# Patient Record
Sex: FEMALE | Race: BLACK OR AFRICAN AMERICAN | NOT HISPANIC OR LATINO | Employment: OTHER
[De-identification: names, ages, dates, MRNs, and addresses within clinical notes are randomized per-mention and may not be internally consistent; named-entity substitution may affect disease eponyms.]

---

## 2023-12-20 ENCOUNTER — TRANSCRIBE ORDERS (OUTPATIENT)
Dept: OTHER | Age: 78
End: 2023-12-20

## 2023-12-20 DIAGNOSIS — K63.89 SMALL BOWEL POLYP: Primary | ICD-10-CM

## 2023-12-21 ENCOUNTER — DOCUMENTATION ONLY (OUTPATIENT)
Dept: GASTROENTEROLOGY | Facility: CLINIC | Age: 78
End: 2023-12-21
Payer: COMMERCIAL

## 2023-12-21 NOTE — PROGRESS NOTES
Called to obtain copy of capsule study.    Provided mailing address.    Records Requested:  -- Capsule study (11-16-23)    Facility Information:  [facility] Gastroenterology   [address] [place] [address].   [place] [address]   Phone #: [phone]       SK

## 2023-12-22 NOTE — PROGRESS NOTES
Received call from [facility] [place] [facility]. Caller stated that they would send video capsule via [employer].    Provided e-mail address.     Records Requested:  -- Capsule study (11-16-23)     Facility Information:  [facility]   [facility] [address] Gastroenterology   [address] [facility] [place] [address]   Phone #: [phone]         SK

## 2023-12-27 NOTE — PROGRESS NOTES
Called [facility] to follow-up on request.     GI Nurse relayed that an IT ticket had been placed today (12-27-23) to complete request.     Will follow-up as needed.    Records Requested:  -- Capsule study (11-16-23)     Facility Information:  [facility] Gastroenterology   [address] [address]   Phone #: [phone]         SK

## 2024-01-03 NOTE — PROGRESS NOTES
Called [facility] to follow-up on request.     GI Nurse relayed that an IT ticket was still pending for the order. RN stated that she would inquire as to the delay and reach out if necessary.     Will follow-up as needed.     Records Requested:  -- Capsule study (11-16-23)     Facility Information:  [facility] [address] Gastroenterology   [address] [facility] [place] [address]   Phone #: [phone]       SK

## 2024-01-05 ENCOUNTER — TELEPHONE (OUTPATIENT)
Dept: GASTROENTEROLOGY | Facility: CLINIC | Age: 79
End: 2024-01-05
Payer: MEDICARE

## 2024-01-10 NOTE — TELEPHONE ENCOUNTER
Called patient with update on referral, still awaiting capsule study and overread by our physician.   Left message    ML  
[facility]    Phone Message    May a detailed message be left on voicemail: yes     Reason for Call: Other: Melissa is calling in once more to schedule an appt from the referral that we received, but it has not been triaged as of yet. Please call back as soon as possible to discuss.     Action Taken: Message routed to:  [facility] GI    Travel Screening: Not Applicable                                                                    
[facility] Call [facility]    Phone Message    May a detailed message be left on voicemail: yes     Reason for Call: Other: Pt is requesting a call back please to schedule for her referral from 12/20/23, the referral has not yet been triaged. Please advise. Thank you!     Action Taken: Message routed to:  [facility] GI    Travel Screening: Not Applicable                                                                    
Patient called back, explained we are waiting on the capsule study from [employer], will call with further plans once that is reviewed    ML  
Surgical Assessment Completed on: 03-Jun-2023 22:00

## 2024-01-12 NOTE — PROGRESS NOTES
[facility] to follow-up on request.     Requested that CD be mailed out. Confirmed mailing address. Will follow-up as needed.     Records Requested:  -- Capsule study (11-16-23)     Facility Information:  [facility]   Specialty [place] [address] Gastroenterology   [address] [facility] [place] [address]   Phone #: [phone]         SK

## 2024-01-24 ENCOUNTER — TELEPHONE (OUTPATIENT)
Dept: GASTROENTEROLOGY | Facility: CLINIC | Age: 79
End: 2024-01-24
Payer: MEDICARE

## 2024-01-24 DIAGNOSIS — R93.3 ABNORMAL FINDING ON GI TRACT IMAGING: Primary | ICD-10-CM

## 2024-01-24 NOTE — TELEPHONE ENCOUNTER
Advanced Endoscopy     Referring provider: LISE Castaneda affiliated with [facility] at [address] [place] [address].     Referred to: [facility]     Provider Requested: na     Referral Received: 23, capsule received 24     Records received: CareEverywhere     Images received: capsule being delivered to Dr. Sellers    Insurance Coverage: tbd    Evaluation for: Capsule with  Nonbleeding gastric and small bowel angioectasias and Polypoid mass in distal small bowel     Clinical History (per RN review):     Patient has history of iron deficiency anemia. Colonoscopy in October showed friable tissue but capsule recommended as follow up.   Capsule showing non bleeding angioectasias and Polypoid mass in distal small bowel      Capsule Study 23  CAPSULE ENDOSCOPY PROCEDURE REPORT    DATE OF SERVICE: 2023  : 1945    SUBJECTIVE: The patient referred for capsule endoscopy to further evaluate iron deficiency. Recent EGD, colonoscopy performed this year. EGD notable for gastropathy. Biopsies confirmed the presence of limited intestinal metaplasia at the greater curvature of the gastric antrum biopsies. Remainder of biopsies unremarkable without evidence of intestinal metaplasia or Helicobacter pylori. She is recommended upper endoscopy in 3 years given those findings. She was also referred for colonoscopy notable for some friability with spontaneous bleeding in the ascending colon, otherwise unremarkable. Biopsies from that area were normal. She was referred for capsule endoscopy.    CAPSULE ENDOSCOPY FINDINGS:     1. First gastric image 00:00:22.  2. First duodenal image 01:39:37.  3. First cecal image 04:34:48.  4. Total gastric passage, 1 hour 39 minutes.  5. Total small bowel passage, 2 hours 55 minutes.    Endoscopic views of the esophagus are limited, but grossly normal. Nonspecific irregularities in the gastric mucosa seen at 00:08:27,  00:34:54, 01:27:44 and 01:28:48. Unclear if these may represent changes from recognized intestinal metaplasia versus irritation from medications. Nonbleeding angiectasia seen in the stomach at 01:29:55 and 01:02:17. Stomach was otherwise unremarkable. Similar angioectasias seen scattered throughout the small bowel, all are nonbleeding. These were visualized at 02:26:09, 02:26:15, 02:49:38, 02:49:56, 03:32:14, 04:10:49, 04:10:50, 04:13:28, 04:14:10, 04:15:06, 04:15:08, 04:16:12, 04:17:15, 04:18:38, 04:21:13, 04:26:31, 04:28:43. In addition, there appears to be a polypoid mass in the distal small bowel. This is seen at 04:17:34, 04:18:54, 04:20:28, 04:21:35, 04:26:11, 04:27:03. Unclear if this is the same polypoid mass seen multiple times or if there could be multiple ones in a small stretch of small bowel. Remainder of small bowel notable for normal villi. Neither fresh nor old blood seen throughout. Endoscopic views of the colon are limited by fecal debris, but are grossly normal.    IMPRESSION, REPORT AND PLAN:     1. Gastric mucosa abnormalities.   Nonspecific gastric mucosal abnormalities seen, may be related to known intestinal metaplasia versus irritation from benign condition such as medications.    2. Nonbleeding gastric and small bowel angioectasias.   Multiple nonbleeding gastric and small bowel angiectasias seen throughout the stomach and small bowel. These do not appear to be actively bleeding and of note, the patient has most recently hemoglobin 10.1 from 4 months ago and ferritin slightly low at 14. Would be reasonable to consider iron supplementation. Could consider endoscopic intervention if the patient has overt bleeding or anemia requiring blood transfusions. Given the widespread nature of the angioectasias, she would likely benefit from referral to a center such as [facility] or [facility], who can perform double balloon endoscopy if treatment of suspected bleeding angioectasias was the  "case.    3. Polypoid mass in distal small bowel  incidentally noted on this exam. It appears to be a polypoid mass with villous features in the distal small bowel, suspect that this may be a villous adenoma. Unclear to what extent the picture is identifying one lesion versus multiple lesions in a small area. Given the presence of this finding, would recommend referral to gastroenterologist at a center such as [facility] or [place], who can perform retrograde balloon assisted endoscopy to identify this lesion and potential removal. Referral and coordination of care to be done by the referring provider.   Recommendations     - As the angioectasias are not actively bleeding continue iron supplementation and lab monitoring per PCP.   - The incidental small bowel polypoid lesion we will need further evaluation and likely resection. This would need to be reached with balloon assisted endoscopy. Thistype of procedure can be performed at a referral center such as [facility] or [facility]. Referring provider should coordinate referral to Gastroenterology at one of those locations for consideration of retrograde balloon assisted endoscopy. \"       EGD/colonoscopy 10-5-23  Findings:       mild/moderate non thrombosed physiologic internal        hemorrhoids        A scattered area of moderately friable mucosa with        spontaneous bleeding was found in the ascending        colon. Biopsies were taken with a cold forceps for        histology. Verification of patient identification for        the specimen was done by the physician and nurse        using the patient's name and birth date. Estimated        blood loss was minimal.        The exam was otherwise without abnormality on direct        and retroflexion views.   Moderate Sedation:        Moderate (conscious) sedation was administered by the        nurse and supervised by the endoscopist. The        patient's oxygen saturation, heart rate, blood        " pressure and response to care were monitored. Total        physician intraservice time was 9 minutes.        This time is the duration from the initial medication        administration until the endoscopy RN assists with        initial maneuvers (biopsy / polypectomy / etc.), or        if no maneuvers are performed, until the endoscopist        leaves the room.   Impression:            - Friability with spontaneous                          bleeding in the ascending colon.                          Biopsied.                          - The examination was otherwise                          normal on direct and retroflexion                          views.   Recommendation:        - Discharge patient to home.                          - Repeat colonoscopy for                          surveillance based on pathology                          results.                          - Return to referring physician as                          previously scheduled.     MD review date:   MD Decision for clinic consultation/Orders:            Referral updates/Patient contacted:

## 2024-01-25 ENCOUNTER — TELEPHONE (OUTPATIENT)
Dept: GASTROENTEROLOGY | Facility: CLINIC | Age: 79
End: 2024-01-25
Payer: MEDICARE

## 2024-01-25 DIAGNOSIS — R93.3 ABNORMAL FINDING ON GI TRACT IMAGING: Primary | ICD-10-CM

## 2024-01-25 RX ORDER — BISACODYL 5 MG/1
TABLET, DELAYED RELEASE ORAL
Qty: 2 TABLET | Refills: 0 | Status: SHIPPED | OUTPATIENT
Start: 2024-01-25

## 2024-01-25 NOTE — TELEPHONE ENCOUNTER
[facility] unable to access prior capsule study (cannot open via multiple attempts) Dr. Sellers recommending repeat capsules at our facility.  Patient agreeable, orders placed for new capsule, pt transferred to scheduling.  ML

## 2024-01-25 NOTE — TELEPHONE ENCOUNTER
Pre visit planning completed.      Procedure details:    Patient scheduled for Video capsule endoscopy  on 2/5/2024.     Arrival time: 0930. Procedure time 1030    Pre op exam needed? N/A    Facility location: [facility]; [address] [place] [address], 3rd Floor, [place] [address]    Sedation type: none     Indication for procedure: Abnormal finding on GI tract imaging [R93.3]       Chart review:     Electronic implanted devices? No    Recent diagnosis of diverticulitis within the last 6 weeks? No    Diabetic? Yes. Not on diabetic medication    Diabetic medication HOLDING recommendations: (if applicable)  Oral diabetic medications: No  Diabetic injectables: No  Insulin: No      Medication review:    Anticoagulants? No    NSAIDS? No NSAID medications per patient's medication list.  RN will verify with pre-assessment call.    Other medication HOLDING recommendations:  Iron supplements: HOLD 7 days before procedure.      Prep for procedure:     Bowel prep recommendation: VCE-Golytely   Due to:  diabetes.     Prep instructions sent via letter     Bowel prep script sent to:please send script to pharmacy of pt's choice        Kassandra Lipscomb RN  Endoscopy Procedure Pre Assessment RN  [phone] option 4

## 2024-01-25 NOTE — TELEPHONE ENCOUNTER
Non-Invasive GI Procedure Scheduling Questionnaire    Have you had a positive Covid test in the last 14 days?  No    Are you active on [employer]?   Yes    What insurance is in the chart?  Other:  medicare     Ordering/Referring Provider:     JOHN OJEDA       (If ordering provider performs procedure, schedule with ordering provider unless otherwise instructed. )    (Females) Are you currently pregnant? No - Okay to continue scheduling.    Have you ever had or are you awaiting a heart or lung transplant? No - Schedule next available.    Do you need assistance transferring? No - Continue scheduling.    Do you take acid reflux medication or proton-pump inhibitors (PPI)? Yes - VCE         Patient Reminders:  Please inform patients to review instructions sent via [employer] or letter two weeks before procedure.  The Manometry exam may take up to 90 minutes.  The Breath Test exam may take up to 4 hours.

## 2024-01-25 NOTE — TELEPHONE ENCOUNTER
Pre assessment completed for upcoming procedure.   (Please see previous telephone encounter notes for complete details)    Patient  returned call.       Procedure details:    Arrival time and facility location reviewed.    Pre op exam needed? N/A    Designated  policy reviewed. Instructed to have someone stay 0 hours post procedure.     COVID policy reviewed.      Medication review:    Ferrous Sulfate (iron supplement): HOLD 7 days before procedure.      Prep for procedure:     Procedure prep instructions reviewed.        Any additional information needed:  N/A      Patient  verbalized understanding and had no questions or concerns at this time.      Kassandra Lipscomb RN  Endoscopy Procedure Pre Assessment RN  [phone] option 4

## 2024-01-25 NOTE — LETTER
January 25, 2024      Melissa Richey  [address]   [facility] [address]              Video capsule endoscopy      Procedure date: 2/5/2024    Anticipated arrival time: 9:30 AM   (Procedure Times are Subject to Change)    [facility] location[facility]; [address], [place] [address] - Check in location: Main entrance at registration desk. Parking information: Self pay parking available in the Patient & Visitor Ramp on the corner of  [facility] and [facility] [facility].  options are available 24 hours for patients with mobility limitations. Self-park and shuttle service from the parking ramp available. The phone number for shuttle requests is [phone].      Important Procedure Reminders:     Prep Instructions:   Instructions on how to prepare for your upcoming procedure are found below. Please read instructions carefully. Deviation from instructions may result in less than desired outcomes and procedure may need to be rescheduled. If you have additional questions regarding how to prepare for your upcoming procedure, please contact our endoscopy pre assessment nurses at [phone] option 4.      Policy:   On the day of your procedure, please designatean adult(s) who can drive you home and stay with you for 6 hours post procedure. The medicines used in the exam will make you sleepy. You will not be able to drive. You cannot take public transportation, ride share services, or non-medical taxi service without a responsible caregiver.  Medical transport services are allowed with the requirement that a responsible caregiver will receive you at your destination.  We require that drivers and caregivers are confirmed prior to your procedure.    Day of procedure:  Please do not wear jewelry (i.e. earrings, rings, necklaces, watches, etc) .   Leave your purse, billfold, credit cards, and other valuables at home.   Bring insurance card and ID.     To cancel or  reschedule your procedure:   Please call our endoscopy scheduling team at: [facility]oscopy: [phone], option 2. Monday through Friday, 7:00am-5:00pm.      Medication Reminders:    Please note the following medication holding recommendations:   Ferrous Sulfate (iron supplement): HOLD 7 days before procedure.    ----------------------------------------------------------------------------------------------------------------------------------------------------------------------------------------------------------------------------------------------------------------------      Instructions for Video Capsule Endoscopy   (Using Golytely bowel prep)   Check in at: [facility]; [address] [facility], [address] [place] [address], [place] [address]  Please read these instructions carefully at least 7 days before your exam.  Pre-Assessment Phone Number: [facility]; [phone] option 4    What happens during this exam?  You are having an exam that allows us to see inside your small intestines. First, you will swallow a pillsize video camera. It will take pictures of your small intestine over the next 12 hours. The pictures will be sent to a recorder you wear on a belt. You will complete the test at home and return the belt and recorder the next day.    It is up to you to closely follow these instructions to clean out your intestines. If you do not, you may need to repeat the exam.    Getting ready  - A nurse will call before your exam. At this time, we ll discuss any prescriptions you might need to . If it s the day before your exam and you haven t received a call, please call your clinic.  - Check your insurance to be sure the exam is covered.  - If you have advanced kidney disease, are on dialysis, or have a pacemaker, AICD or other implantable device: please call the endoscopy center. We may need to change how you get ready for this exam.    Important: You must  complete all of the steps before the exam.    7 days before your exam:  Talk to your prescribing provider: If you take prescription NSAIDS (such as Sulindac, Celebrex, Mobic, Relafen). Your prescribing provider will tell you what to do.   Stop taking multi-vitamins with iron or medicines that contain iron.   Golytely bowel prep prescription from pharmacy.     1 day before your exam  Begin a clear liquid diet (see page 2). Drink at least 8 to 10 glasses of clear liquids. Avoid colored clear liquids (see examples below).  At 12 p.m. noon take 2 Dulcolax tablets.  Fill the jug that contains the Golytely powder with warm water. Cover and shake until well mixed. Use a full gallon of water. Chill for 3 hours, but do not add ice.  At 3 p.m. Start drinking one 8-ounce glass every 15 minutes of Golytely solution until container is empty.  Stay near a toilet when using this medicine. You will have diarrhea and may have mild cramping or bloating.    Day of your exam   You may have only clear liquids until 2 hours before the arrival time.   If you must take medicine, you may take it with sips of water.    If you have asthma: bring your inhaler with you.   Dress in a loose, two-piece outfit. The sensor belt will be in place over your clothes for 12 hours. Keep the belt on and the recorder connected during this time.   You may drive yourself home.    What are clear liquids?   You may have:  - Water, tea (no cream)  - Clear nutrition drinks (Enlive, Resource Breeze)   - Fat-free soup broth or bouillon  - Clear life savers (avoid all other colors)  - Clear juices, sodas and fruit-flavored drinks such as apple juice, white grape juice, 7up (avoid colored clear liquids)   Do not have:  - Coffee  - Milk or milk products such as ice cream, malts or shakes  - Red or purple drinks of any kind such as cranberry juice or grape juice. Avoid red or purple Jell-O, Popsicles, Michele-Aid, sorbet and candy  - Jello, popsicles as these are  colored.  Juices with pulp such as orange, grapefruit, pineapple or tomato juice  - Cream soups of any kind  - Alcohol       Your video capsule endoscopy  What to do during your test:   After you swallow the pill camera, do not eat or drink for at least 2 hours.   After 2 hours, you may have clear liquids and take medicines.   After 6 hours, you may have a light snack.   When the exam is done (12 hours after you swallowed the pill), remove the belt and recorder.  Place it in a bag to return the next day.   You may return to your normal diet.    Caring for the recorder   Do not move suddenly or bump the recorder.   Do not expose the recorder to direct sunlight.   Do not do activities that cause you to sweat.   Do not bend over or stoop, if possible.   Do not go near any strong electro-magnetic fields, such as an MRI device or a ham radio.    When the test is over  The capsule can take up to a week to pass out of your body. In most cases you will not see the capsule exit your body. If you develop new onset abdominal pain,nausea, or vomiting within 3 days of your exam please notify the clinic.    In the rare case the capsule does not come out naturally, we will need to complete an x-ray to verify it did not pass and remove it via a second procedure.   Do not have an MRI within 2 weeks of your procedure. An MRI could cause serious harm while the capsule is still in your body.    Test results  You will receive your results in 7 to 10 business days by phone, letter or My Chart.    You will receive a call prior to your appointment. Please let us know if you have any outstanding questions from these instructions.   For questions or appointments, call:  [facility] Endoscopy   [phone], option 2  Monday through Friday, 7 a.m. to 5 p.m.  (If it is after hours please reach out to the clinic or provider you were scheduled with.)    You should be aware that your endoscopist may be a part of a study to improve  endoscopy procedures.  As part of a study, pictures gathered from your procedure may be stored and analyzed in a de-identified manner.

## 2024-02-05 ENCOUNTER — HOSPITAL ENCOUNTER (OUTPATIENT)
Facility: CLINIC | Age: 79
Discharge: HOME | End: 2024-02-05
Attending: INTERNAL MEDICINE | Admitting: INTERNAL MEDICINE
Payer: MEDICARE

## 2024-02-05 PROCEDURE — 91110 GI TRC IMG INTRAL ESOPH-ILE: CPT | Performed by: INTERNAL MEDICINE

## 2024-02-05 PROCEDURE — 250N000013 HC RX MED GY IP 250 OP 250 PS 637: Performed by: INTERNAL MEDICINE

## 2024-02-05 RX ORDER — SIMETHICONE 40MG/0.6ML
SUSPENSION, DROPS(FINAL DOSAGE FORM)(ML) ORAL PRN
Status: DISCONTINUED | OUTPATIENT
Start: 2024-02-05 | End: 2024-02-05 | Stop reason: HOSPADM

## 2024-02-05 NOTE — OR NURSING
Procedure: Video Capsule Endoscopy  Sedation: None  Consent: Confirmed procedure consent as signed by patient  Pre-procedure Education:   Confirmed completion of bowel prep with pt/RN.  Pre-video capsule instructions reviewed in depth with pt.   Printed materials given to pt for further review.   Pt voices questions answered to satisfaction prior to swallowing capsule.   Education included indication, possible benefits, possible risks, procedure, follow-up with particular attention paid to notation of passing capsule via BM.  Pt specifically instructed not to have MRI until confirmation that capsule has been passed.   Patient ID label placed on clipboard in GI Physician Room for Dr. Hobson.  Patient ID Label placed on Endoscopy Charge (inpatients)/ Book (outpatients & inpatients) clipboard for 24-hour equipment return - data download    NPO Times: SWALLOWED AT 0945  NPO from ingestion of capsule until 1145  (x 2 hrs).  Clear fluids with PO meds after 1145  (2 hrs post ingestion).  Light snack after 1545 (6 hrs post ingestion).  Regular diet after 2145 (12 hrs post ingestion).     Capsule Insertion: Pt tolerated procedure.  Monitor with belt applied.   Pt swallowed video capsule with 8 oz H2O + simethacone 2 ml added    Primary Care RN Report:  Report given to Primary Care RN including NPO/clear fluids/light snack/regular diet times upon completion of procedure      Additional Notes: Patient responsibility for equipment contract signed.    Michelle Randhawa, RN, RN  [facility]oscopy Unit

## 2024-02-07 LAB — VIDEO CAPSULE ENDOSCOPY: NORMAL

## 2024-03-04 ENCOUNTER — PREP FOR PROCEDURE (OUTPATIENT)
Dept: GASTROENTEROLOGY | Facility: CLINIC | Age: 79
End: 2024-03-04
Payer: COMMERCIAL

## 2024-03-04 DIAGNOSIS — R93.3 ABNORMAL FINDING ON GI TRACT IMAGING: Primary | ICD-10-CM

## 2024-03-04 NOTE — TELEPHONE ENCOUNTER
Per Dr. Sellers  We can organize a by the mouth enteroscopy though Im not sure Ill get deep enough to reach the lesion.     Please assist in scheduling:     Procedure/Imaging/Clinic: enteroscopy  Physician: Verito  Timing: 3/20  Scope time needed:provider average  Anesthesia:General  Dx: abnormal finding on gi imaging  Tier:Tier 3 -   Surgeries/procedures that can be delayed  between 30 to 90 days with no significant  morbidity/mortality to patient or impact on  patient/disease outcome.   Location: [facility]  Header of letter for pt communication: Enteroscopy      Called to discuss with patient.     Explained they can expect a call from  for date of procedure, OR will call 1-2 days prior to procedure date with arrival time, will need a , someone to stay with them for 24 hours and should stay in town for 24 hours (within 45 min of Hospital) post procedure    Patient needs to get pre-op physical completed. If outside  health system will need physical faxed to number [phone]     If you do not get a preop physical, your procedure could be cancelled, patient voiced understanding*    Preop Plan:PCP will do, Shira Castaneda      Does patient have any history of gastric bypass/gastric surgery/altered panc/bili anatomy?no    Any recent Covid symptoms or positive covid test?no    Does patient have [employer] insurance?:no    Med Review    Blood thinner -  no  ASA - no  Diabetic - no  Any meds by injection or mouth for weight loss or diabetes-no    Patient Education r/t procedure:done    A pre-op nurse will call 1-2 days prior to the procedure.    NPO/Prep:   Adults and Children of all ages may consume solids up to 8 hours prior to arrival time - may consume clear liquids up to 1 hour prior to arrival time.    Other specific details/comments:no     Verbalized understanding of all instructions. All questions answered.     Procedure order placed, message routed to OR / Endo

## 2024-03-19 ENCOUNTER — ANESTHESIA EVENT (OUTPATIENT)
Dept: SURGERY | Facility: CLINIC | Age: 79
End: 2024-03-19
Payer: COMMERCIAL

## 2024-03-19 NOTE — ANESTHESIA PREPROCEDURE EVALUATION
Anesthesia Pre-Procedure Evaluation    Patient: Melissa Richey   MRN: [id number] : 1945        Procedure : Procedure(s):  ENTEROSCOPY          No past medical history on file.   Past Surgical History:   Procedure Laterality Date    CAPSULE/PILL CAM ENDOSCOPY N/A 2024    Procedure: Capsule/pill cam endoscopy;  Surgeon: Yeison Hobson MD;  Location: [facility]      No Known Allergies   Social History     Tobacco Use    Smoking status: Not on file    Smokeless tobacco: Not on file   Substance Use Topics    Alcohol use: Not on file      Wt Readings from Last 1 Encounters:   No data found for Wt        Anesthesia Evaluation   Pt has had prior anesthetic. Type: General and MAC.    No history of anesthetic complications       ROS/MED HX  ENT/Pulmonary:    (-) tobacco use, asthma, COPD and recent URI   Neurologic:     (+)    no peripheral neuropathy                         (-) no seizures, no CVA, no TIA and migraines   Cardiovascular:     (+)  hypertension- -   -  - -                                   (-) ROSARIO, orthopnea/PND, syncope and valvular problems/murmurs   METS/Exercise Tolerance:     Hematologic:    (-) history of blood clots   Musculoskeletal:       GI/Hepatic: Comment:   Capsule Endoscopy Results: A medium-sized frond-like/villous mass with no bleeding was found in the small bowel at 2-hours 8-minutes.   Impression:              - Mid-Small bowel mass frond-like, sessile, polypoid mass. Not bleeding   - Multiple diminutive small bowel angioectasias. No bleeding     (+) GERD, Asymptomatic on medication,                  Renal/Genitourinary:       Endo:     (+)  type II DM, Last HgA1c: 5.5, date: 3/14/24,      thyroid problem, hypothyroidism,           Psychiatric/Substance Use:       Infectious Disease:       Malignancy:       Other:            Physical Exam    Airway        Mallampati: II   TM distance: > 3 FB   Neck ROM: full   Mouth opening: > 3 cm    Respiratory Devices and Support         Dental        (+) Modest Abnormalities - crowns, retainers, 1 or 2 missing teeth    B=Bridge, C=Chipped, L=Loose, M=Missing    Cardiovascular          Rhythm and rate: regular and normal     Pulmonary           breath sounds clear to auscultation           OUTSIDE LABS:  CBC:   Component 03/14/2024     WBC  3.5 - 10.5 x10(9)/L 4.9   RBC  3.90 - 5.03 x10(12)/L 3.92   Hemoglobin  12.0 - 15.5 g/dL 12.5   HCT  34.9 - 44.5 % 39.1   MCV  80.0 - 100.0 fL 99.7   MCH  27.6 - 33.3 pg 31.9   MCHC  31.5 - 35.2 g/dL 32.0   RDW  11.9 - 15.5 % 14.0   Platelets  150 - 450 x10(9)/L 216     BMP:   Component  Ref Range & Units 7 mo ago   Sodium  136 - 145 mmol/L 144   Potassium  3.5 - 5.1 mmol/L 3.3 Low    Chloride  98 - 109 mmol/L 110 High    CO2  20 - 29 mmol/L 23   Anion Gap  7 - 16 mmol/L 11   Calcium  8.4 - 10.4 mg/dL 9.0   BUN  7 - 26 mg/dL 8   Creatinine  0.55 - 1.02 mg/dL 0.92   Alkaline Phosphatase  40 - 150 U/L 44   AST (SGOT)  10 - 40 U/L 17   ALT (SGPT)  <=55 U/L 11   Bilirubin, Total  0.2 - 1.2 mg/dL 0.3   Protein, Total  6.4 - 8.3 g/dL 6.5   Albumin  3.5 - 5.0 g/dL 3.4 Low    Glucose  70 - 100 mg/dL 112 High    Comment: The given reference range is for the fasting state. Non-fasting reference range for glucose is 70 - 180 mg/dL.   Hours Fasting 0   GFR, Estimated  >60 mL/min/1.73m2 >60     OTHER:   Hgb A1C  Component  Ref Range & Units 5 d ago   Hemoglobin A1C  <=5.6 % 5.5   Estimated Average Glucose (Calc)  < 117 mg/dL 111   Comment: Estimated average glucose (eAG) converts A1c into glucose units (mg/dL) and estimates average glucose over the past approximately 3 months.  The eAG reference interval (<117 mg/dL) corresponds to an A1c of <5.7%.     TSH  Component  Ref Range & Units 7 mo ago   TSH, Reflex  0.30 - 4.50 uIU/mL 1.38     Anesthesia Plan    ASA Status:  2       Anesthesia Type: General.     - Airway: ETT   Induction: Intravenous.   Maintenance: Balanced.        Consents    Anesthesia Plan(s) and associated risks,  benefits, and realistic alternatives discussed. Questions answered and patient/representative(s) expressed understanding.     - Discussed: Risks, Benefits and Alternatives for BOTH SEDATION and the PROCEDURE were discussed     - Discussed with:  Patient      - Extended Intubation/Ventilatory Support Discussed: No.      - Patient is DNR/DNI Status: No     Use of blood products discussed: No .     Postoperative Care    Pain management: Multi-modal analgesia.   PONV prophylaxis: Ondansetron (or other 5HT-3), Dexamethasone or Solumedrol     Comments:               Ashleigh Francis MD    I have reviewed the pertinent notes and labs in the chart from the past 30 days and (re)examined the patient.  Any updates or changes from those notes are reflected in this note.

## 2024-03-20 ENCOUNTER — ANESTHESIA (OUTPATIENT)
Dept: SURGERY | Facility: CLINIC | Age: 79
End: 2024-03-20
Payer: COMMERCIAL

## 2024-03-20 ENCOUNTER — APPOINTMENT (OUTPATIENT)
Dept: GENERAL RADIOLOGY | Facility: CLINIC | Age: 79
End: 2024-03-20
Attending: INTERNAL MEDICINE
Payer: COMMERCIAL

## 2024-03-20 ENCOUNTER — HOSPITAL ENCOUNTER (OUTPATIENT)
Facility: CLINIC | Age: 79
Discharge: HOME | End: 2024-03-20
Attending: INTERNAL MEDICINE | Admitting: INTERNAL MEDICINE
Payer: COMMERCIAL

## 2024-03-20 VITALS
OXYGEN SATURATION: 96 % | BODY MASS INDEX: 21.13 KG/M2 | HEIGHT: 63 IN | RESPIRATION RATE: 18 BRPM | DIASTOLIC BLOOD PRESSURE: 90 MMHG | TEMPERATURE: 97.6 F | HEART RATE: 81 BPM | SYSTOLIC BLOOD PRESSURE: 164 MMHG | WEIGHT: 119.27 LBS

## 2024-03-20 LAB — SMALL BOWEL ENTEROSCOPY: NORMAL

## 2024-03-20 PROCEDURE — 250N000011 HC RX IP 250 OP 636: Performed by: NURSE ANESTHETIST, CERTIFIED REGISTERED

## 2024-03-20 PROCEDURE — 272N000001 HC OR GENERAL SUPPLY STERILE: Performed by: INTERNAL MEDICINE

## 2024-03-20 PROCEDURE — 710N000010 HC RECOVERY PHASE 1, LEVEL 2, PER MIN: Performed by: INTERNAL MEDICINE

## 2024-03-20 PROCEDURE — 360N000083 HC SURGERY LEVEL 3 W/ FLUORO, PER MIN: Performed by: INTERNAL MEDICINE

## 2024-03-20 PROCEDURE — 250N000009 HC RX 250: Performed by: INTERNAL MEDICINE

## 2024-03-20 PROCEDURE — 250N000009 HC RX 250: Performed by: NURSE ANESTHETIST, CERTIFIED REGISTERED

## 2024-03-20 PROCEDURE — 710N000012 HC RECOVERY PHASE 2, PER MINUTE: Performed by: INTERNAL MEDICINE

## 2024-03-20 PROCEDURE — 250N000011 HC RX IP 250 OP 636: Performed by: STUDENT IN AN ORGANIZED HEALTH CARE EDUCATION/TRAINING PROGRAM

## 2024-03-20 PROCEDURE — 258N000003 HC RX IP 258 OP 636: Performed by: STUDENT IN AN ORGANIZED HEALTH CARE EDUCATION/TRAINING PROGRAM

## 2024-03-20 PROCEDURE — 250N000013 HC RX MED GY IP 250 OP 250 PS 637: Performed by: STUDENT IN AN ORGANIZED HEALTH CARE EDUCATION/TRAINING PROGRAM

## 2024-03-20 PROCEDURE — 370N000017 HC ANESTHESIA TECHNICAL FEE, PER MIN: Performed by: INTERNAL MEDICINE

## 2024-03-20 PROCEDURE — 44360 SMALL BOWEL ENDOSCOPY: CPT | Performed by: NURSE ANESTHETIST, CERTIFIED REGISTERED

## 2024-03-20 PROCEDURE — 999N000179 XR SURGERY CARM FLUORO LESS THAN 5 MIN W STILLS: Mod: TC

## 2024-03-20 PROCEDURE — 250N000024 HC ISOFLURANE, PER MIN: Performed by: INTERNAL MEDICINE

## 2024-03-20 PROCEDURE — 999N000141 HC STATISTIC PRE-PROCEDURE NURSING ASSESSMENT: Performed by: INTERNAL MEDICINE

## 2024-03-20 PROCEDURE — 99100 ANES PT EXTEME AGE<1 YR&>70: CPT | Performed by: NURSE ANESTHETIST, CERTIFIED REGISTERED

## 2024-03-20 RX ORDER — LIDOCAINE 40 MG/G
CREAM TOPICAL
Status: DISCONTINUED | OUTPATIENT
Start: 2024-03-20 | End: 2024-03-20 | Stop reason: HOSPADM

## 2024-03-20 RX ORDER — LIDOCAINE HYDROCHLORIDE 20 MG/ML
INJECTION, SOLUTION INFILTRATION; PERINEURAL PRN
Status: DISCONTINUED | OUTPATIENT
Start: 2024-03-20 | End: 2024-03-20

## 2024-03-20 RX ORDER — OXYCODONE HYDROCHLORIDE 10 MG/1
10 TABLET ORAL
Status: DISCONTINUED | OUTPATIENT
Start: 2024-03-20 | End: 2024-03-20 | Stop reason: HOSPADM

## 2024-03-20 RX ORDER — HYDROCHLOROTHIAZIDE 12.5 MG/1
1 TABLET ORAL DAILY
COMMUNITY
Start: 2024-03-14

## 2024-03-20 RX ORDER — LEVOTHYROXINE SODIUM 75 UG/1
75 TABLET ORAL
COMMUNITY

## 2024-03-20 RX ORDER — ONDANSETRON 2 MG/ML
4 INJECTION INTRAMUSCULAR; INTRAVENOUS EVERY 30 MIN PRN
Status: DISCONTINUED | OUTPATIENT
Start: 2024-03-20 | End: 2024-03-20 | Stop reason: HOSPADM

## 2024-03-20 RX ORDER — NALOXONE HYDROCHLORIDE 0.4 MG/ML
0.4 INJECTION, SOLUTION INTRAMUSCULAR; INTRAVENOUS; SUBCUTANEOUS
Status: DISCONTINUED | OUTPATIENT
Start: 2024-03-20 | End: 2024-03-20 | Stop reason: HOSPADM

## 2024-03-20 RX ORDER — OXYCODONE HYDROCHLORIDE 5 MG/1
5 TABLET ORAL
Status: DISCONTINUED | OUTPATIENT
Start: 2024-03-20 | End: 2024-03-20 | Stop reason: HOSPADM

## 2024-03-20 RX ORDER — FENTANYL CITRATE 50 UG/ML
25 INJECTION, SOLUTION INTRAMUSCULAR; INTRAVENOUS EVERY 5 MIN PRN
Status: DISCONTINUED | OUTPATIENT
Start: 2024-03-20 | End: 2024-03-20 | Stop reason: HOSPADM

## 2024-03-20 RX ORDER — ATORVASTATIN CALCIUM 20 MG/1
20 TABLET, FILM COATED ORAL DAILY
COMMUNITY
Start: 2023-01-19

## 2024-03-20 RX ORDER — FENTANYL CITRATE 50 UG/ML
INJECTION, SOLUTION INTRAMUSCULAR; INTRAVENOUS PRN
Status: DISCONTINUED | OUTPATIENT
Start: 2024-03-20 | End: 2024-03-20

## 2024-03-20 RX ORDER — ONDANSETRON 4 MG/1
4 TABLET, ORALLY DISINTEGRATING ORAL EVERY 30 MIN PRN
Status: DISCONTINUED | OUTPATIENT
Start: 2024-03-20 | End: 2024-03-20 | Stop reason: HOSPADM

## 2024-03-20 RX ORDER — PROCHLORPERAZINE MALEATE 5 MG
5 TABLET ORAL EVERY 6 HOURS PRN
Status: DISCONTINUED | OUTPATIENT
Start: 2024-03-20 | End: 2024-03-20 | Stop reason: HOSPADM

## 2024-03-20 RX ORDER — ONDANSETRON 2 MG/ML
4 INJECTION INTRAMUSCULAR; INTRAVENOUS
Status: DISCONTINUED | OUTPATIENT
Start: 2024-03-20 | End: 2024-03-20 | Stop reason: HOSPADM

## 2024-03-20 RX ORDER — SODIUM CHLORIDE, SODIUM LACTATE, POTASSIUM CHLORIDE, CALCIUM CHLORIDE 600; 310; 30; 20 MG/100ML; MG/100ML; MG/100ML; MG/100ML
INJECTION, SOLUTION INTRAVENOUS CONTINUOUS PRN
Status: DISCONTINUED | OUTPATIENT
Start: 2024-03-20 | End: 2024-03-20

## 2024-03-20 RX ORDER — FLUMAZENIL 0.1 MG/ML
0.2 INJECTION, SOLUTION INTRAVENOUS
Status: DISCONTINUED | OUTPATIENT
Start: 2024-03-20 | End: 2024-03-20 | Stop reason: HOSPADM

## 2024-03-20 RX ORDER — FOLIC ACID 1 MG/1
1 TABLET ORAL
COMMUNITY
Start: 2023-07-07

## 2024-03-20 RX ORDER — PROPOFOL 10 MG/ML
INJECTION, EMULSION INTRAVENOUS PRN
Status: DISCONTINUED | OUTPATIENT
Start: 2024-03-20 | End: 2024-03-20

## 2024-03-20 RX ORDER — ACETAMINOPHEN 325 MG/1
975 TABLET ORAL ONCE
Status: COMPLETED | OUTPATIENT
Start: 2024-03-20 | End: 2024-03-20

## 2024-03-20 RX ORDER — LOSARTAN POTASSIUM 25 MG/1
1 TABLET ORAL DAILY
COMMUNITY
Start: 2024-03-14

## 2024-03-20 RX ORDER — LABETALOL HYDROCHLORIDE 5 MG/ML
10 INJECTION, SOLUTION INTRAVENOUS
Status: COMPLETED | OUTPATIENT
Start: 2024-03-20 | End: 2024-03-20

## 2024-03-20 RX ORDER — POTASSIUM CHLORIDE 750 MG/1
2 TABLET, EXTENDED RELEASE ORAL DAILY
COMMUNITY
Start: 2023-10-03

## 2024-03-20 RX ORDER — ESMOLOL HYDROCHLORIDE 10 MG/ML
INJECTION INTRAVENOUS PRN
Status: DISCONTINUED | OUTPATIENT
Start: 2024-03-20 | End: 2024-03-20

## 2024-03-20 RX ORDER — NALOXONE HYDROCHLORIDE 0.4 MG/ML
0.1 INJECTION, SOLUTION INTRAMUSCULAR; INTRAVENOUS; SUBCUTANEOUS
Status: DISCONTINUED | OUTPATIENT
Start: 2024-03-20 | End: 2024-03-20 | Stop reason: HOSPADM

## 2024-03-20 RX ORDER — METOPROLOL SUCCINATE 50 MG/1
1 TABLET, EXTENDED RELEASE ORAL DAILY
COMMUNITY
Start: 2024-01-16

## 2024-03-20 RX ORDER — FERROUS SULFATE 325(65) MG
325 TABLET ORAL
COMMUNITY
Start: 2023-08-04

## 2024-03-20 RX ORDER — ONDANSETRON 4 MG/1
4 TABLET, ORALLY DISINTEGRATING ORAL EVERY 6 HOURS PRN
Status: DISCONTINUED | OUTPATIENT
Start: 2024-03-20 | End: 2024-03-20 | Stop reason: HOSPADM

## 2024-03-20 RX ORDER — LABETALOL HYDROCHLORIDE 5 MG/ML
10 INJECTION, SOLUTION INTRAVENOUS ONCE
Status: COMPLETED | OUTPATIENT
Start: 2024-03-20 | End: 2024-03-20

## 2024-03-20 RX ORDER — FAMOTIDINE 20 MG/1
TABLET, FILM COATED ORAL
COMMUNITY
Start: 2024-03-18

## 2024-03-20 RX ORDER — FENTANYL CITRATE 50 UG/ML
50 INJECTION, SOLUTION INTRAMUSCULAR; INTRAVENOUS EVERY 5 MIN PRN
Status: DISCONTINUED | OUTPATIENT
Start: 2024-03-20 | End: 2024-03-20 | Stop reason: HOSPADM

## 2024-03-20 RX ORDER — NALOXONE HYDROCHLORIDE 0.4 MG/ML
0.2 INJECTION, SOLUTION INTRAMUSCULAR; INTRAVENOUS; SUBCUTANEOUS
Status: DISCONTINUED | OUTPATIENT
Start: 2024-03-20 | End: 2024-03-20 | Stop reason: HOSPADM

## 2024-03-20 RX ORDER — ONDANSETRON 2 MG/ML
4 INJECTION INTRAMUSCULAR; INTRAVENOUS EVERY 6 HOURS PRN
Status: DISCONTINUED | OUTPATIENT
Start: 2024-03-20 | End: 2024-03-20 | Stop reason: HOSPADM

## 2024-03-20 RX ORDER — ONDANSETRON 2 MG/ML
INJECTION INTRAMUSCULAR; INTRAVENOUS PRN
Status: DISCONTINUED | OUTPATIENT
Start: 2024-03-20 | End: 2024-03-20

## 2024-03-20 RX ADMIN — ACETAMINOPHEN 975 MG: 325 TABLET, FILM COATED ORAL at 06:13

## 2024-03-20 RX ADMIN — SUGAMMADEX 200 MG: 100 INJECTION, SOLUTION INTRAVENOUS at 08:40

## 2024-03-20 RX ADMIN — FENTANYL CITRATE 50 MCG: 50 INJECTION INTRAMUSCULAR; INTRAVENOUS at 07:43

## 2024-03-20 RX ADMIN — LABETALOL HYDROCHLORIDE 10 MG: 5 INJECTION, SOLUTION INTRAVENOUS at 09:51

## 2024-03-20 RX ADMIN — SODIUM CHLORIDE, POTASSIUM CHLORIDE, SODIUM LACTATE AND CALCIUM CHLORIDE: 600; 310; 30; 20 INJECTION, SOLUTION INTRAVENOUS at 07:23

## 2024-03-20 RX ADMIN — ONDANSETRON 4 MG: 2 INJECTION INTRAMUSCULAR; INTRAVENOUS at 08:39

## 2024-03-20 RX ADMIN — PROPOFOL 30 MG: 10 INJECTION, EMULSION INTRAVENOUS at 08:29

## 2024-03-20 RX ADMIN — LIDOCAINE HYDROCHLORIDE 60 MG: 20 INJECTION, SOLUTION INFILTRATION; PERINEURAL at 07:30

## 2024-03-20 RX ADMIN — FENTANYL CITRATE 50 MCG: 50 INJECTION INTRAMUSCULAR; INTRAVENOUS at 07:59

## 2024-03-20 RX ADMIN — Medication 30 MG: at 07:30

## 2024-03-20 RX ADMIN — ESMOLOL HYDROCHLORIDE 70 MG: 10 INJECTION, SOLUTION INTRAVENOUS at 07:31

## 2024-03-20 RX ADMIN — LABETALOL HYDROCHLORIDE 10 MG: 5 INJECTION, SOLUTION INTRAVENOUS at 09:09

## 2024-03-20 RX ADMIN — PROPOFOL 100 MG: 10 INJECTION, EMULSION INTRAVENOUS at 07:30

## 2024-03-20 ASSESSMENT — ACTIVITIES OF DAILY LIVING (ADL)
ADLS_ACUITY_SCORE: 31
ADLS_ACUITY_SCORE: 32
ADLS_ACUITY_SCORE: 31

## 2024-03-20 ASSESSMENT — ENCOUNTER SYMPTOMS
ORTHOPNEA: 0
SEIZURES: 0

## 2024-03-20 ASSESSMENT — LIFESTYLE VARIABLES: TOBACCO_USE: 0

## 2024-03-20 ASSESSMENT — COPD QUESTIONNAIRES: COPD: 0

## 2024-03-20 NOTE — OP NOTE
"[facility] 03/20/2024  7:00 AM [address] [place] [address] [phone]     Endoscopy Department  _______________________________________________________________________________  Patient Name: Melissa Richey        Procedure Date: 3/20/2024 7:00 AM  MRN: [id number]                       Account Number: [id number]  YOB: 1945              Admit Type: Outpatient  Age: 79                               Room: [place] [address]  Gender: Female                        Note Status: Finalized  Attending MD: JOHN OJEDA MD,   Total Sedation Time:  _______________________________________________________________________________     Procedure:             Small bowel enteroscopy  Indications:           Abnormal video capsule endoscopy  Providers:             JOHN OJEDA MD, Nancie Lopez, CE ROJAS MD  Patient Profile:       Ms Richey is a 80yo woman who was found to have                         nonbleeding angioectasias on capsule as well as a                         frond-like mass in the mid small bowel. She is without                         evidence of GI bleeding and proceeds to anterograde                         enteroscopy for evaluation. Of note, she had multiple                         \"feet\" of small bowel removed in the setting of a                         bowel obstruction in the past.  Referring MD:          Shira Castaneda  Medicines:             General Anesthesia  Complications:         No immediate complications.  _______________________________________________________________________________  Procedure:             Pre-Anesthesia Assessment:                         - Prior to the procedure, a History and Physical was                         performed, and patient medications and allergies were                         reviewed. The patient is competent. The risks and                "          benefits of the procedure and the sedation options and                         risks were discussed with the patient. All questions                         were answered and informed consent was obtained.                         Patient identification and proposed procedure were                         verified by the nurse in the pre-procedure area.                         Mental Status Examination: alert and oriented. Airway                         Examination: Mallampati Class II (the uvula but not                         tonsillar pillars visualized). Respiratory                         Examination: clear to auscultation. CV Examination:                         normal. ASA Grade Assessment: III - A patient with                         severe systemic disease. After reviewing the risks and                         benefits, the patient was deemed in satisfactory                         condition to undergo the procedure. The anesthesia                         plan was to use general anesthesia. Immediately prior                         to administration of medications, the patient was                         re-assessed for adequacy to receive sedatives. The                         heart rate, respiratory rate, oxygen saturations,                         blood pressure, adequacy of pulmonary ventilation, and                         response to care were monitored throughout the                         procedure. The physical status of the patient was                         re-assessed after the procedure. After obtaining                         informed consent, the endoscope was passed under                         direct vision. Throughout the procedure, the patient's                         blood pressure, pulse, and oxygen saturations were                         monitored continuously. The enteroscope was introduced                         through the mouth and advanced to the proximal ileum.                          The small bowel enteroscopy was accomplished without                         difficulty. The patient tolerated the procedure well.                                                                                   Findings:       The patient was supine under general and a enteroscope was used in       concert with a ballooned overtube and advanced to the ileoileal       anastomosis before no further progress was made. As this anastomosis was       not described on previous capsules the area just upstream was demarcated       with submucosal injection of carbon black. The bowel upstream was       grossly unremarkable including clear views of the esophagus, stomach,       duodenum, jejunum and proximal ileum. No angioectasias or bleeding was       found. The anastomosis was patent and healthy appearing.                                                                                   Impression:            - Unremarkable foregut and mid gut through the                         ileoileal anastomosis with injection of submcusal                         carbon black  Recommendation:        - General anesthesia recovery with probable discharge                         home this morning                         - All medications, diet and activity may resume                         without delay                         - Follow up with established proviers                         - Repeat capsule endoscopy to be read by Dr Sellers                         personally using the tattoo to determine the relevant                         location of any lesion; it is possible that the                         anastomosis which was not described is the irregular                         area being described; we may also need to consider                         retrograde enteroscopy                         - The findings and recommendations were discussed with                         the patient and their family                                                                                      electronically signed by SCOTT Sellers

## 2024-03-20 NOTE — ANESTHESIA POSTPROCEDURE EVALUATION
Patient: Melissa Richey    Procedure: Procedure(s):  ENTEROSCOPY with submucosal tatoo       Anesthesia Type:  General    Note:  Disposition: Outpatient   Postop Pain Control: Uneventful            Sign Out: Well controlled pain   PONV: No   Neuro/Psych: Uneventful            Sign Out: Acceptable/Baseline neuro status   Airway/Respiratory: Uneventful            Sign Out: Acceptable/Baseline resp. status   CV/Hemodynamics: Uneventful            Sign Out: Acceptable CV status; No obvious hypovolemia; No obvious fluid overload (Several doses of labetalol given in PACU; patient missed AM dose of hydralazine)   Other NRE: NONE   DID A NON-ROUTINE EVENT OCCUR? No           Last vitals:  Vitals Value Taken Time   /85 03/20/24 1010   Temp 36.5  C (97.7  F) 03/20/24 0900   Pulse 85 03/20/24 1012   Resp 22 03/20/24 1012   SpO2 97 % 03/20/24 1014   Vitals shown include unfiled device data.    Electronically Signed By: Leslie Goldberg, MD  March 20, 2024  10:15 AM

## 2024-03-20 NOTE — ANESTHESIA CARE TRANSFER NOTE
Patient: Melissa Richey    Procedure: Procedure(s):  ENTEROSCOPY with submucosal tatoo       Diagnosis: Abnormal finding on GI tract imaging [R93.3]  Diagnosis Additional Information: No value filed.    Anesthesia Type:   General     Note:    Oropharynx: oropharynx clear of all foreign objects  Level of Consciousness: awake  Oxygen Supplementation: face mask      Dentition: dentition unchanged  Vital Signs Stable: post-procedure vital signs reviewed and stable  Report to RN Given: handoff report given  Patient transferred to: PACU    Handoff Report: Identifed the Patient, Identified the Reponsible Provider, Reviewed the pertinent medical history, Discussed the surgical course, Reviewed Intra-OP anesthesia mangement and issues during anesthesia, Set expectations for post-procedure period and Allowed opportunity for questions and acknowledgement of understanding  Vitals:  Vitals Value Taken Time   /91 03/20/24 0857   Temp     Pulse 81 03/20/24 0858   Resp 22 03/20/24 0858   SpO2 100 % 03/20/24 0858   Vitals shown include unfiled device data.    Electronically Signed By: GIOVANNI Guthrie CRNA  March 20, 2024  8:59 AM

## 2024-03-20 NOTE — DISCHARGE INSTRUCTIONS
Contacting your Doctor -   To contact a doctor, call Dr Sellers's clinic at [phone]  or:  [phone] and ask for the resident on call for Gastroenterology (answered 24 hours a day)   Emergency Department:  [facility]: [phone]  [facility]: [phone] 911 if you are in need of immediate or emergent help

## 2024-03-20 NOTE — ANESTHESIA PROCEDURE NOTES
Airway       Patient location during procedure: OR  Staff -        CRNA: Priya Richey APRN CRNA       Performed By: CRNA  Consent for Airway        Urgency: elective  Indications and Patient Condition       Indications for airway management: elizabeth-procedural       Induction type:intravenous       Mask difficulty assessment: 1 - vent by mask    Final Airway Details       Final airway type: endotracheal airway       Successful airway: ETT - single  Endotracheal Airway Details        ETT size (mm): 7.0       Cuffed: yes       Successful intubation technique: direct laryngoscopy       DL Blade Type: MAC 3       Grade View of Cords: 1       Adjucts: stylet       Position: Right       Measured from: lips       Secured at (cm): 21       Bite block used: None    Post intubation assessment        Placement verified by: capnometry, equal breath sounds and chest rise        Number of attempts at approach: 1       Secured with: tape       Ease of procedure: easy       Dentition: Intact and Unchanged

## 2024-03-25 ENCOUNTER — PATIENT OUTREACH (OUTPATIENT)
Dept: GASTROENTEROLOGY | Facility: CLINIC | Age: 79
End: 2024-03-25
Payer: COMMERCIAL

## 2024-03-25 DIAGNOSIS — R93.3 ABNORMAL FINDING ON GI TRACT IMAGING: Primary | ICD-10-CM

## 2024-03-25 NOTE — TELEPHONE ENCOUNTER
Post enteroscopy with Dr. Sellers 3-20-24  Repeat capsule endoscopy to be read by Dr Sellers   personally using the tattoo to determine the relevant location of any lesion; it is possible that the anastomosis which was not described is the irregular area being described; we may also need to consider   retrograde enteroscopy     Called to check in with patient. Orders placed for repeat capsule study. Transferred to scheduling    ML

## 2024-03-26 ENCOUNTER — TELEPHONE (OUTPATIENT)
Dept: GASTROENTEROLOGY | Facility: CLINIC | Age: 79
End: 2024-03-26
Payer: COMMERCIAL

## 2024-03-26 NOTE — TELEPHONE ENCOUNTER
Pre assessment completed for upcoming procedure.   (Please see previous telephone encounter notes for complete details)      Procedure details:    Arrival time and facility location reviewed.    Pre op exam needed? N/A    Designated  policy reviewed. Instructed to have someone stay N/A  hours post procedure.       Medication review:    Medications reviewed. Please see supporting documentation below. Holding recommendations discussed (if applicable).       Prep for procedure:     Procedure prep instructions reviewed.        Any additional information needed:  N/A      Patient  verbalized understanding and had no questions or concerns at this time.      Kassandra Lipscomb RN  Endoscopy Procedure Pre Assessment RN  [phone] option 4

## 2024-03-26 NOTE — TELEPHONE ENCOUNTER
Pre visit planning completed.      Procedure details:    Patient scheduled for Video capsule endoscopy  on 4/8/2024.     Arrival time: 0700. Procedure time 0800    Facility location: [facility] [place], [place]; [address] [place] [address], [address] Floor, [place] [address]. Check in location: Main entrance at registration desk.    Sedation type: none     Pre op exam needed? N/A    Indication for procedure: Abnormal finding on GI tract imaging [R93.3]       Chart review:     Electronic implanted devices? No    Recent diagnosis of diverticulitis within the last 6 weeks? N/A    Diabetic? Yes. Not on diabetic medication      Medication review:    Anticoagulants? No    NSAIDS? No NSAID medications per patient's medication list.  RN will verify with pre-assessment call.    Other medication HOLDING recommendations:  N/A      Prep for procedure:     Bowel prep recommendation: Video Capsule bowel prep (miralax/dulcolax)  Due to: standard bowel prep.    Prep instructions sent via [employer]         Kassandra Lipscomb RN  Endoscopy Procedure Pre Assessment RN  [phone] option 4

## 2024-04-08 ENCOUNTER — HOSPITAL ENCOUNTER (OUTPATIENT)
Facility: CLINIC | Age: 79
Discharge: HOME | End: 2024-04-08
Attending: INTERNAL MEDICINE | Admitting: INTERNAL MEDICINE
Payer: COMMERCIAL

## 2024-04-08 PROCEDURE — 250N000013 HC RX MED GY IP 250 OP 250 PS 637: Performed by: INTERNAL MEDICINE

## 2024-04-08 PROCEDURE — 91110 GI TRC IMG INTRAL ESOPH-ILE: CPT | Performed by: INTERNAL MEDICINE

## 2024-04-08 RX ORDER — SIMETHICONE 40MG/0.6ML
SUSPENSION, DROPS(FINAL DOSAGE FORM)(ML) ORAL PRN
Status: DISCONTINUED | OUTPATIENT
Start: 2024-04-08 | End: 2024-04-08 | Stop reason: HOSPADM

## 2024-04-08 ASSESSMENT — ACTIVITIES OF DAILY LIVING (ADL): ADLS_ACUITY_SCORE: 36

## 2024-04-08 NOTE — OR NURSING
Procedure: Video Capsule Endoscopy  Sedation: None  Consent: Confirmed procedure consent as signed by patient  Pre-procedure Education:   Confirmed completion of bowel prep with pt/RN.  Pre-video capsule instructions reviewed in depth with pt.   Printed materials given to pt for further review.   Pt voices questions answered to satisfaction prior to swallowing capsule.   Education included indication, possible benefits, possible risks, procedure, follow-up with particular attention paid to notation of passing capsule via BM.  Pt specifically instructed not to have MRI until confirmation that capsule has been passed.     Wristband applied   Patient ID label placed on clipboard in GI Physician Room for Dr. Ozuna.  Patient ID Label placed on Endoscopy Charge (inpatients)/ Book (outpatients & inpatients) clipboard for 24-hour equipment return - data download    NPO Times: SWALLOWED AT 0730  NPO from ingestion of capsule until 0930  (x 2 hrs).  Clear fluids with PO meds after 0930  (2 hrs post ingestion).  Light snack after 1330 (6 hrs post ingestion).  Regular diet after 1930 (12 hrs post ingestion).     Capsule Insertion: Pt tolerated procedure.  Monitor with belt applied.   Pt swallowed video capsule with 8 oz H2O + simethacone 2 ml added    Primary Care RN Report:  Report given to Primary Care RN including NPO/clear fluids/light snack/regular diet times upon completion of procedure      Additional Notes: Patient liability for equipment contract signed.    Michelle Randhawa, RN, RN  [facility] Endoscopy Unit

## 2024-04-12 LAB — VIDEO CAPSULE ENDOSCOPY: NORMAL

## 2024-04-24 ENCOUNTER — PATIENT OUTREACH (OUTPATIENT)
Dept: GASTROENTEROLOGY | Facility: CLINIC | Age: 79
End: 2024-04-24
Payer: COMMERCIAL

## 2024-04-24 NOTE — TELEPHONE ENCOUNTER
Per Dr. Sellers after review of capsule study:  As no mass lesion was found, I would recommend surveillance capsule in one year without further enteroscopy unless she is thought to have active bleeding.     Called patient to discuss. Pt states she's doing well, not having symptoms. Understands plan for repeat capsule study in 1 year.    ML

## 2024-04-27 ENCOUNTER — HEALTH MAINTENANCE LETTER (OUTPATIENT)
Age: 79
End: 2024-04-27

## 2025-03-11 ENCOUNTER — TELEPHONE (OUTPATIENT)
Dept: GASTROENTEROLOGY | Facility: CLINIC | Age: 80
End: 2025-03-11
Payer: COMMERCIAL

## 2025-03-11 DIAGNOSIS — R93.3 ABNORMAL FINDING ON GI TRACT IMAGING: Primary | ICD-10-CM

## 2025-03-11 NOTE — TELEPHONE ENCOUNTER
Non-Invasive GI Procedure Scheduling Questionnaire    Scheduling Reminders:  Add-on within 1 week require clinic approval (Manometry & HBT)  Manometry requires an in-person interrupter (not needed for HBT)      Have you had any respiratory illness or flu-like symptoms in the last 10 days?  No    Are you active on MyChart?   Yes    What insurance is in the chart?  Other:  [employer] & Medicare    Ordering/Referring Provider: Verito   (If ordering provider performs procedure, schedule with ordering provider unless otherwise instructed. )    (Females) Are you currently pregnant? No - Okay to continue scheduling.    Have you ever had or are you awaiting a heart or lung transplant? No - Schedule next available.    Do you need assistance transferring? No - Continue scheduling.    Do you take acid reflux medication or proton-pump inhibitors (PPI)? No - Continue scheduling.    Patient Reminders:  Please inform patients to review instructions sent via [employer] or letter two weeks before procedure.  The Manometry exam may take up to 90 minutes.  The Breath Test exam may take up to 4 hours.    Non - Invasive Pool: P [facility] NURS[facility] [id number]

## 2025-03-13 ENCOUNTER — TELEPHONE (OUTPATIENT)
Dept: GASTROENTEROLOGY | Facility: CLINIC | Age: 80
End: 2025-03-13
Payer: COMMERCIAL

## 2025-03-13 DIAGNOSIS — R93.3 ABNORMAL FINDING ON GI TRACT IMAGING: Primary | ICD-10-CM

## 2025-03-13 RX ORDER — BISACODYL 5 MG/1
TABLET, DELAYED RELEASE ORAL
Qty: 2 TABLET | Refills: 0 | Status: SHIPPED | OUTPATIENT
Start: 2025-03-13

## 2025-03-13 NOTE — TELEPHONE ENCOUNTER
"Pre visit planning completed.      Procedure details:    Patient scheduled for Video capsule endoscopy on 3/27/25.     Arrival time: 0800. Procedure time 0800    Facility location: [facility]; [address], [address], [place] [address]. Check in location: Main entrance at registration desk.  *Disclaimer: Drivers are to check in with patient and stay on campus during procedure.     Sedation type: none     Pre op exam needed? No.    Indication for procedure: Abnormal finding on GI tract imaging      Chart review:     Electronic implanted devices? No    Recent diagnosis of diverticulitis within the last 6 weeks? No      Medication review:    Diabetic? No    Anticoagulants? No    Weight loss medication/injectable? No GLP-1 medication per patient's medication list. Nursing to verify with pre-assessment call.    Other medication HOLDING recommendations:  Ferrous sulfate (iron supplements): HOLD 7 days before procedure.      Prep for procedure:     Bowel prep recommendation: Video Capsule bowel prep (Golytely)  Due to: poor prep/inadequate bowel prep in the past  Per 3/8/24 VCE, \"mediocre prep may have obscured key findings\". Due to this, writer tiered up bowel prep.    Procedure information and instructions sent via rhonda Sen RN  Endoscopy Procedure Pre Assessment   [phone] option 3    "

## 2025-03-13 NOTE — TELEPHONE ENCOUNTER
Pre assessment completed for upcoming procedure.   (Please see previous telephone encounter notes for complete details)      Procedure details:    Arrival time and facility location reviewed.    Pre op exam needed? No.    No sedation or  needed.      Medication review:    Medications reviewed. Please see supporting documentation below. Holding recommendations discussed (if applicable).   Ferrous Sulfate (iron supplement): HOLD 7 days before procedure.      Prep for procedure:    Procedure prep instructions reviewed.        Any additional information needed:  N/A      Patient verbalized understanding and had no questions or concerns at this time.      Priya Sutton LPN  Endoscopy Procedure Pre Assessment   [phone] option 3

## 2025-03-27 ENCOUNTER — HOSPITAL ENCOUNTER (OUTPATIENT)
Facility: CLINIC | Age: 80
Discharge: HOME | End: 2025-03-27
Attending: INTERNAL MEDICINE | Admitting: INTERNAL MEDICINE
Payer: COMMERCIAL

## 2025-03-27 PROCEDURE — 250N000013 HC RX MED GY IP 250 OP 250 PS 637: Performed by: INTERNAL MEDICINE

## 2025-03-27 PROCEDURE — 91110 GI TRC IMG INTRAL ESOPH-ILE: CPT | Performed by: INTERNAL MEDICINE

## 2025-03-27 RX ORDER — SIMETHICONE 40MG/0.6ML
SUSPENSION, DROPS(FINAL DOSAGE FORM)(ML) ORAL PRN
Status: DISCONTINUED | OUTPATIENT
Start: 2025-03-27 | End: 2025-03-27 | Stop reason: HOSPADM

## 2025-03-27 ASSESSMENT — ACTIVITIES OF DAILY LIVING (ADL): ADLS_ACUITY_SCORE: 42

## 2025-03-27 NOTE — OR NURSING
Pt to endoscopy for a capsule endoscopy after EGD and colonoscopy were completed and no source was located. Pt has had multiple capsules. Pt swallowed the capsule without difficulty. Watched the capsule enter the stomach via the data recorder. Pt to return the equipment tomorrow, 3-28

## 2025-03-30 ENCOUNTER — HEALTH MAINTENANCE LETTER (OUTPATIENT)
Age: 80
End: 2025-03-30

## 2025-04-10 LAB — VIDEO CAPSULE ENDOSCOPY: NORMAL

## 2025-05-11 ENCOUNTER — HEALTH MAINTENANCE LETTER (OUTPATIENT)
Age: 80
End: 2025-05-11

## (undated) DEVICE — ENDO TUBING CO2 SMARTCAP STERILE DISP 100145CO2EXT

## (undated) DEVICE — GLOVE BIOGEL PI ULTRATOUCH G SZ 7.5 42175

## (undated) DEVICE — ENDO TUBE SPLINTING ST-SB1

## (undated) DEVICE — PAD CHUX UNDERPAD 23X24" 7136

## (undated) DEVICE — ENDO TANK RESERVOIR FOR SPLINTING TUBE MAJ-1727

## (undated) DEVICE — KIT CONNECTOR FOR OLYMPUS ENDOSCOPES DEFENDO 100310

## (undated) DEVICE — SUCTION MANIFOLD NEPTUNE 2 SYS 4 PORT 0702-020-000

## (undated) DEVICE — ENDO MARKER 5ML SOLUTION FOR GI TRACT BX00711836

## (undated) DEVICE — NDL SCLEROTHERAPY 25GA CARR-LOCK  00711811

## (undated) DEVICE — SOL WATER IRRIG 1000ML BOTTLE 2F7114

## (undated) DEVICE — KIT ENDO FIRST STEP DISINFECTANT 200ML W/POUCH EP-4

## (undated) DEVICE — ENDO BITE BLOCK ADULT OMNI-BLOC

## (undated) DEVICE — PACK ENDOSCOPY GI CUSTOM UMMC

## (undated) DEVICE — ESU GROUND PAD ADULT W/CORD E7507

## (undated) RX ORDER — SIMETHICONE 40MG/0.6ML
SUSPENSION, DROPS(FINAL DOSAGE FORM)(ML) ORAL
Status: DISPENSED
Start: 2025-03-27

## (undated) RX ORDER — ESMOLOL HYDROCHLORIDE 10 MG/ML
INJECTION INTRAVENOUS
Status: DISPENSED
Start: 2024-03-20

## (undated) RX ORDER — SIMETHICONE 40MG/0.6ML
SUSPENSION, DROPS(FINAL DOSAGE FORM)(ML) ORAL
Status: DISPENSED
Start: 2024-02-05

## (undated) RX ORDER — LABETALOL HYDROCHLORIDE 5 MG/ML
INJECTION, SOLUTION INTRAVENOUS
Status: DISPENSED
Start: 2024-03-20

## (undated) RX ORDER — ACETAMINOPHEN 325 MG/1
TABLET ORAL
Status: DISPENSED
Start: 2024-03-20

## (undated) RX ORDER — FENTANYL CITRATE 50 UG/ML
INJECTION, SOLUTION INTRAMUSCULAR; INTRAVENOUS
Status: DISPENSED
Start: 2024-03-20

## (undated) RX ORDER — SIMETHICONE 40MG/0.6ML
SUSPENSION, DROPS(FINAL DOSAGE FORM)(ML) ORAL
Status: DISPENSED
Start: 2024-04-08